# Patient Record
Sex: FEMALE | Race: WHITE | Employment: OTHER
[De-identification: names, ages, dates, MRNs, and addresses within clinical notes are randomized per-mention and may not be internally consistent; named-entity substitution may affect disease eponyms.]

---

## 2018-07-21 ENCOUNTER — HEALTH MAINTENANCE LETTER (OUTPATIENT)
Age: 48
End: 2018-07-21

## 2019-04-25 ENCOUNTER — OFFICE VISIT (OUTPATIENT)
Dept: URGENT CARE | Facility: RETAIL CLINIC | Age: 49
End: 2019-04-25
Payer: COMMERCIAL

## 2019-04-25 VITALS
SYSTOLIC BLOOD PRESSURE: 122 MMHG | DIASTOLIC BLOOD PRESSURE: 75 MMHG | HEART RATE: 87 BPM | TEMPERATURE: 98.5 F | OXYGEN SATURATION: 97 %

## 2019-04-25 DIAGNOSIS — J20.9 ACUTE BRONCHITIS WITH SYMPTOMS > 10 DAYS: Primary | ICD-10-CM

## 2019-04-25 PROCEDURE — 99213 OFFICE O/P EST LOW 20 MIN: CPT | Performed by: INTERNAL MEDICINE

## 2019-04-25 RX ORDER — AZITHROMYCIN 250 MG/1
TABLET, FILM COATED ORAL
Qty: 6 TABLET | Refills: 0 | Status: SHIPPED | OUTPATIENT
Start: 2019-04-25 | End: 2019-09-20

## 2019-04-25 RX ORDER — ALBUTEROL SULFATE 90 UG/1
2 AEROSOL, METERED RESPIRATORY (INHALATION) 4 TIMES DAILY
Qty: 8.5 G | Refills: 0 | Status: SHIPPED | OUTPATIENT
Start: 2019-04-25 | End: 2019-09-20

## 2019-04-25 RX ORDER — HYDROCODONE BITARTRATE AND ACETAMINOPHEN 5; 325 MG/1; MG/1
1-2 TABLET ORAL EVERY 4 HOURS PRN
COMMUNITY
Start: 2018-02-05

## 2019-04-25 RX ORDER — CLONAZEPAM 0.5 MG/1
0.5 TABLET ORAL 3 TIMES DAILY PRN
COMMUNITY
Start: 2016-10-15

## 2019-04-25 RX ORDER — ESCITALOPRAM OXALATE 20 MG/1
TABLET ORAL
COMMUNITY
Start: 2017-08-30 | End: 2019-04-25

## 2019-04-25 NOTE — PROGRESS NOTES
[facility] Care Progress Note        Ileana Lake MD, MPH  04/25/2019        History:      Heaven Contreras is a pleasant 49 year old year old female with cough and whitish/yellowish sputum production as well as wheezing w/o dyspnea or chest pain x 2 weeks  No fever or chills. .    Smokes 1/2 pack of cigs daily.  No headache or neck stiffness.  No GI or  symptoms.   No MSK symptoms.  No rash         Assessment and Plan:         Acute bronchitis with symptoms > 10 days   Advised to stop smoking.  - azithromycin (ZITHROMAX) 250 MG tablet; Two tablets first day, then one tablet daily for four days.  Dispense: 6 tablet; Refill: 0  - albuterol (PROAIR HFA/PROVENTIL HFA/VENTOLIN HFA) 108 (90 Base) MCG/ACT inhaler; Inhale 2 puffs into the lungs 4 times daily for 7 days  Dispense: 8.5 g; Refill: 0    Discussed supportive care with the patient  Advised to increase fluid intake and rest  F/u w PCP in 4-5 days, earlier if symptoms worsen.                   Physical Exam:      /75   Pulse 87   Temp 98.5  F (36.9  C) (Oral)   SpO2 97%      Constitutional: Patient is in no distress The patient is pleasant and cooperative.   HEENT: Head:  Head is atraumatic, normocephalic.    Eyes: Pupils are equal, round and reactive to light and accomodation.  Sclera is non-icteric. No conjunctival injection, or exudate noted. Extraocular motion is intact. Visual acuity is intact bilaterally.  Ears:  External acoustic canals are patent and clear.  There is no erythema and bulging( exudate)  of the ( R/L ) tympanic membrane(s ).   Nose:  Nasal congestion w/o drainage or mucosal ulceration is noted.  Throat:  Oral mucosa is moist.  No oral lesions are noted. Posterior pharyngeal hyperemia w/o exudate noted.     Neck Supple.  There is no cervical lymphadenopathy.  No nuchal rigidity noted.  There is no meningismus.     Cardiovascular: Heart is regular to rate and rhythm.  No murmur is noted.     Lungs:  Scattered  ronchi i in the anterior and posterior pulmonary fields. No prolonged expiratory phase. No crackles. No respiratory distress.   Abdomen: Soft and non-tender.    Back No flank tenderness is noted.   Extremeties No edema, no calf tenderness.   Neuro: No focal deficit.   Skin No petechiae or purpura is noted.  There is no rash.   Mood Normal              Data:      All new lab and imaging data was reviewed.   Results for orders placed or performed during the hospital encounter of 05/28/16   Shoulder XR, G/E 3 views, left    Narrative    XR SHOULDER LT G/E 3 VW  5/28/2016 3:04 PM    HISTORY:  injury, rule out dislocation    COMPARISON:  None.      Impression    IMPRESSION:  No acute process. Chronic appearing cortical deformity in  the anterior humeral head, possibly a Hill-Sachs deformity.     CADY CARRERA MD

## 2019-09-20 ENCOUNTER — HOSPITAL ENCOUNTER (INPATIENT)
Facility: CLINIC | Age: 49
LOS: 3 days | Discharge: HOME | End: 2019-09-23
Attending: FAMILY MEDICINE | Admitting: HOSPITALIST
Payer: COMMERCIAL

## 2019-09-20 ENCOUNTER — APPOINTMENT (OUTPATIENT)
Dept: CT IMAGING | Facility: CLINIC | Age: 49
End: 2019-09-20
Attending: FAMILY MEDICINE
Payer: COMMERCIAL

## 2019-09-20 ENCOUNTER — APPOINTMENT (OUTPATIENT)
Dept: GENERAL RADIOLOGY | Facility: CLINIC | Age: 49
End: 2019-09-20
Attending: FAMILY MEDICINE
Payer: COMMERCIAL

## 2019-09-20 DIAGNOSIS — K56.609 SMALL BOWEL OBSTRUCTION (H): ICD-10-CM

## 2019-09-20 DIAGNOSIS — R10.13 ABDOMINAL PAIN, EPIGASTRIC: ICD-10-CM

## 2019-09-20 DIAGNOSIS — R11.2 NAUSEA AND VOMITING, INTRACTABILITY OF VOMITING NOT SPECIFIED, UNSPECIFIED VOMITING TYPE: ICD-10-CM

## 2019-09-20 LAB
ALBUMIN SERPL-MCNC: 4.3 G/DL (ref 3.4–5)
ALBUMIN UR-MCNC: 30 MG/DL
ALP SERPL-CCNC: 70 U/L (ref 40–150)
ALT SERPL W P-5'-P-CCNC: 25 U/L (ref 0–50)
ANION GAP SERPL CALCULATED.3IONS-SCNC: 11 MMOL/L (ref 3–14)
APPEARANCE UR: ABNORMAL
AST SERPL W P-5'-P-CCNC: 23 U/L (ref 0–45)
BASOPHILS # BLD AUTO: 0 10E9/L (ref 0–0.2)
BASOPHILS NFR BLD AUTO: 0.3 %
BILIRUB SERPL-MCNC: 0.7 MG/DL (ref 0.2–1.3)
BILIRUB UR QL STRIP: ABNORMAL
BUN SERPL-MCNC: 20 MG/DL (ref 7–30)
CALCIUM SERPL-MCNC: 9.3 MG/DL (ref 8.5–10.1)
CHLORIDE SERPL-SCNC: 106 MMOL/L (ref 94–109)
CO2 SERPL-SCNC: 23 MMOL/L (ref 20–32)
COLOR UR AUTO: ABNORMAL
CREAT SERPL-MCNC: 0.56 MG/DL (ref 0.52–1.04)
DIFFERENTIAL METHOD BLD: ABNORMAL
EOSINOPHIL NFR BLD AUTO: 0 %
ERYTHROCYTE [DISTWIDTH] IN BLOOD BY AUTOMATED COUNT: 12.6 % (ref 10–15)
GFR SERPL CREATININE-BSD FRML MDRD: >90 ML/MIN/{1.73_M2}
GLUCOSE SERPL-MCNC: 133 MG/DL (ref 70–99)
GLUCOSE UR STRIP-MCNC: NEGATIVE MG/DL
HCT VFR BLD AUTO: 44 % (ref 35–47)
HGB BLD-MCNC: 15.4 G/DL (ref 11.7–15.7)
HGB UR QL STRIP: ABNORMAL
HYALINE CASTS #/AREA URNS LPF: 1 /LPF (ref 0–2)
IMM GRANULOCYTES # BLD: 0.1 10E9/L (ref 0–0.4)
IMM GRANULOCYTES NFR BLD: 0.4 %
KETONES UR STRIP-MCNC: 80 MG/DL
LACTATE BLD-SCNC: 0.9 MMOL/L (ref 0.7–2)
LEUKOCYTE ESTERASE UR QL STRIP: NEGATIVE
LIPASE SERPL-CCNC: 101 U/L (ref 73–393)
LYMPHOCYTES # BLD AUTO: 0.6 10E9/L (ref 0.8–5.3)
LYMPHOCYTES NFR BLD AUTO: 4.6 %
MCH RBC QN AUTO: 31.2 PG (ref 26.5–33)
MCHC RBC AUTO-ENTMCNC: 35 G/DL (ref 31.5–36.5)
MCV RBC AUTO: 89 FL (ref 78–100)
MONOCYTES # BLD AUTO: 0.3 10E9/L (ref 0–1.3)
MONOCYTES NFR BLD AUTO: 2 %
MUCOUS THREADS #/AREA URNS LPF: PRESENT /LPF
NEUTROPHILS # BLD AUTO: 12.6 10E9/L (ref 1.6–8.3)
NEUTROPHILS NFR BLD AUTO: 92.7 %
NITRATE UR QL: NEGATIVE
NRBC # BLD AUTO: 0 10*3/UL
NRBC BLD AUTO-RTO: 0 /100
PH UR STRIP: 6 PH (ref 5–7)
PLATELET # BLD AUTO: 294 10E9/L (ref 150–450)
POTASSIUM SERPL-SCNC: 3.4 MMOL/L (ref 3.4–5.3)
PROT SERPL-MCNC: 7.7 G/DL (ref 6.8–8.8)
RBC # BLD AUTO: 4.93 10E12/L (ref 3.8–5.2)
RBC #/AREA URNS AUTO: 17 /HPF (ref 0–2)
SODIUM SERPL-SCNC: 140 MMOL/L (ref 133–144)
SOURCE: ABNORMAL
SP GR UR STRIP: 1.03 (ref 1–1.03)
SQUAMOUS #/AREA URNS AUTO: <1 /HPF (ref 0–1)
UROBILINOGEN UR STRIP-MCNC: 2 MG/DL (ref 0–2)
WBC # BLD AUTO: 13.6 10E9/L (ref 4–11)
WBC #/AREA URNS AUTO: 2 /HPF (ref 0–5)

## 2019-09-20 PROCEDURE — 83605 ASSAY OF LACTIC ACID: CPT | Performed by: FAMILY MEDICINE

## 2019-09-20 PROCEDURE — 85025 COMPLETE CBC W/AUTO DIFF WBC: CPT | Performed by: FAMILY MEDICINE

## 2019-09-20 PROCEDURE — 96374 THER/PROPH/DIAG INJ IV PUSH: CPT | Mod: 59 | Performed by: FAMILY MEDICINE

## 2019-09-20 PROCEDURE — 80306 DRUG TEST PRSMV INSTRMNT: CPT | Performed by: FAMILY MEDICINE

## 2019-09-20 PROCEDURE — 74019 RADEX ABDOMEN 2 VIEWS: CPT | Mod: TC

## 2019-09-20 PROCEDURE — 99207 ZZC CDG-MDM COMPONENT: MEETS LOW - DOWN CODED: CPT | Performed by: HOSPITALIST

## 2019-09-20 PROCEDURE — 80053 COMPREHEN METABOLIC PANEL: CPT | Performed by: FAMILY MEDICINE

## 2019-09-20 PROCEDURE — 74177 CT ABD & PELVIS W/CONTRAST: CPT

## 2019-09-20 PROCEDURE — 25000128 H RX IP 250 OP 636: Performed by: FAMILY MEDICINE

## 2019-09-20 PROCEDURE — 81001 URINALYSIS AUTO W/SCOPE: CPT | Performed by: FAMILY MEDICINE

## 2019-09-20 PROCEDURE — 96361 HYDRATE IV INFUSION ADD-ON: CPT | Performed by: FAMILY MEDICINE

## 2019-09-20 PROCEDURE — 84145 PROCALCITONIN (PCT): CPT | Performed by: HOSPITALIST

## 2019-09-20 PROCEDURE — 83690 ASSAY OF LIPASE: CPT | Performed by: FAMILY MEDICINE

## 2019-09-20 PROCEDURE — 25000125 ZZHC RX 250: Performed by: FAMILY MEDICINE

## 2019-09-20 PROCEDURE — 96376 TX/PRO/DX INJ SAME DRUG ADON: CPT | Performed by: FAMILY MEDICINE

## 2019-09-20 PROCEDURE — 12000000 ZZH R&B MED SURG/OB

## 2019-09-20 PROCEDURE — 99285 EMERGENCY DEPT VISIT HI MDM: CPT | Mod: Z6 | Performed by: FAMILY MEDICINE

## 2019-09-20 PROCEDURE — 99222 1ST HOSP IP/OBS MODERATE 55: CPT | Mod: AI | Performed by: HOSPITALIST

## 2019-09-20 PROCEDURE — 96375 TX/PRO/DX INJ NEW DRUG ADDON: CPT | Performed by: FAMILY MEDICINE

## 2019-09-20 PROCEDURE — 99285 EMERGENCY DEPT VISIT HI MDM: CPT | Mod: 25 | Performed by: FAMILY MEDICINE

## 2019-09-20 RX ORDER — ONDANSETRON 2 MG/ML
4 INJECTION INTRAMUSCULAR; INTRAVENOUS ONCE
Status: COMPLETED | OUTPATIENT
Start: 2019-09-20 | End: 2019-09-20

## 2019-09-20 RX ORDER — LORAZEPAM 2 MG/ML
0.5 INJECTION INTRAMUSCULAR
Status: COMPLETED | OUTPATIENT
Start: 2019-09-20 | End: 2019-09-20

## 2019-09-20 RX ORDER — HYDROMORPHONE HYDROCHLORIDE 1 MG/ML
0.5 INJECTION, SOLUTION INTRAMUSCULAR; INTRAVENOUS; SUBCUTANEOUS ONCE
Status: COMPLETED | OUTPATIENT
Start: 2019-09-20 | End: 2019-09-20

## 2019-09-20 RX ORDER — ONDANSETRON 4 MG/1
4 TABLET, ORALLY DISINTEGRATING ORAL EVERY 6 HOURS PRN
COMMUNITY
Start: 2017-10-19

## 2019-09-20 RX ORDER — AMOXICILLIN 250 MG
1 CAPSULE ORAL DAILY PRN
COMMUNITY
Start: 2018-07-09

## 2019-09-20 RX ORDER — IOPAMIDOL 755 MG/ML
500 INJECTION, SOLUTION INTRAVASCULAR ONCE
Status: COMPLETED | OUTPATIENT
Start: 2019-09-20 | End: 2019-09-20

## 2019-09-20 RX ADMIN — IOPAMIDOL 50 ML: 755 INJECTION, SOLUTION INTRAVENOUS at 21:12

## 2019-09-20 RX ADMIN — SODIUM CHLORIDE 1000 ML: 9 INJECTION, SOLUTION INTRAVENOUS at 19:44

## 2019-09-20 RX ADMIN — SODIUM CHLORIDE 1000 ML: 9 INJECTION, SOLUTION INTRAVENOUS at 22:04

## 2019-09-20 RX ADMIN — SODIUM CHLORIDE 60 ML: 9 INJECTION, SOLUTION INTRAVENOUS at 21:12

## 2019-09-20 RX ADMIN — HYDROMORPHONE HYDROCHLORIDE 0.5 MG: 1 INJECTION, SOLUTION INTRAMUSCULAR; INTRAVENOUS; SUBCUTANEOUS at 21:29

## 2019-09-20 RX ADMIN — LORAZEPAM 0.5 MG: 2 INJECTION INTRAMUSCULAR; INTRAVENOUS at 23:14

## 2019-09-20 RX ADMIN — ONDANSETRON 4 MG: 2 INJECTION INTRAMUSCULAR; INTRAVENOUS at 21:26

## 2019-09-20 RX ADMIN — ONDANSETRON 4 MG: 2 INJECTION INTRAMUSCULAR; INTRAVENOUS at 19:44

## 2019-09-20 RX ADMIN — LORAZEPAM 0.5 MG: 2 INJECTION INTRAMUSCULAR; INTRAVENOUS at 22:06

## 2019-09-20 ASSESSMENT — MIFFLIN-ST. JEOR: SCORE: 1063.6

## 2019-09-21 ENCOUNTER — APPOINTMENT (OUTPATIENT)
Dept: GENERAL RADIOLOGY | Facility: CLINIC | Age: 49
End: 2019-09-21
Attending: HOSPITALIST
Payer: COMMERCIAL

## 2019-09-21 PROBLEM — D35.01 ADRENAL ADENOMA, RIGHT: Status: ACTIVE | Noted: 2019-09-21

## 2019-09-21 PROBLEM — K56.609 SBO (SMALL BOWEL OBSTRUCTION) (H): Status: ACTIVE | Noted: 2019-09-21

## 2019-09-21 PROBLEM — E27.8 ADRENAL MASS, RIGHT (H): Status: ACTIVE | Noted: 2019-09-21

## 2019-09-21 PROBLEM — E27.8 ADRENAL MASS, RIGHT (H): Status: RESOLVED | Noted: 2019-09-21 | Resolved: 2019-09-21

## 2019-09-21 LAB
ALBUMIN SERPL-MCNC: 3.2 G/DL (ref 3.4–5)
ALP SERPL-CCNC: 53 U/L (ref 40–150)
ALT SERPL W P-5'-P-CCNC: 19 U/L (ref 0–50)
AMPHETAMINES UR QL: NOT DETECTED NG/ML
ANION GAP SERPL CALCULATED.3IONS-SCNC: 8 MMOL/L (ref 3–14)
AST SERPL W P-5'-P-CCNC: 17 U/L (ref 0–45)
BARBITURATES UR QL SCN: NOT DETECTED NG/ML
BENZODIAZ UR QL SCN: ABNORMAL NG/ML
BILIRUB SERPL-MCNC: 0.6 MG/DL (ref 0.2–1.3)
BUN SERPL-MCNC: 17 MG/DL (ref 7–30)
BUPRENORPHINE UR QL: NOT DETECTED NG/ML
CALCIUM SERPL-MCNC: 8.1 MG/DL (ref 8.5–10.1)
CANNABINOIDS UR QL: ABNORMAL NG/ML
CHLORIDE SERPL-SCNC: 112 MMOL/L (ref 94–109)
CO2 SERPL-SCNC: 23 MMOL/L (ref 20–32)
COCAINE UR QL SCN: NOT DETECTED NG/ML
CREAT SERPL-MCNC: 0.62 MG/DL (ref 0.52–1.04)
D-METHAMPHET UR QL: NOT DETECTED NG/ML
ERYTHROCYTE [DISTWIDTH] IN BLOOD BY AUTOMATED COUNT: 13 % (ref 10–15)
GFR SERPL CREATININE-BSD FRML MDRD: >90 ML/MIN/{1.73_M2}
GLUCOSE SERPL-MCNC: 134 MG/DL (ref 70–99)
HCT VFR BLD AUTO: 38.5 % (ref 35–47)
HGB BLD-MCNC: 13.1 G/DL (ref 11.7–15.7)
MCH RBC QN AUTO: 31 PG (ref 26.5–33)
MCHC RBC AUTO-ENTMCNC: 34 G/DL (ref 31.5–36.5)
MCV RBC AUTO: 91 FL (ref 78–100)
METHADONE UR QL SCN: NOT DETECTED NG/ML
OPIATES UR QL SCN: ABNORMAL NG/ML
OXYCODONE UR QL SCN: NOT DETECTED NG/ML
PCP UR QL SCN: NOT DETECTED NG/ML
PLATELET # BLD AUTO: 249 10E9/L (ref 150–450)
POTASSIUM SERPL-SCNC: 3.4 MMOL/L (ref 3.4–5.3)
PROCALCITONIN SERPL-MCNC: <0.05 NG/ML
PROPOXYPH UR QL: NOT DETECTED NG/ML
PROT SERPL-MCNC: 6 G/DL (ref 6.8–8.8)
RBC # BLD AUTO: 4.22 10E12/L (ref 3.8–5.2)
SODIUM SERPL-SCNC: 143 MMOL/L (ref 133–144)
TRICYCLICS UR QL SCN: NOT DETECTED NG/ML
WBC # BLD AUTO: 12.5 10E9/L (ref 4–11)

## 2019-09-21 PROCEDURE — 99233 SBSQ HOSP IP/OBS HIGH 50: CPT | Performed by: FAMILY MEDICINE

## 2019-09-21 PROCEDURE — 25000128 H RX IP 250 OP 636: Performed by: HOSPITALIST

## 2019-09-21 PROCEDURE — 12000000 ZZH R&B MED SURG/OB

## 2019-09-21 PROCEDURE — 74018 RADEX ABDOMEN 1 VIEW: CPT | Mod: TC

## 2019-09-21 PROCEDURE — 85027 COMPLETE CBC AUTOMATED: CPT | Performed by: FAMILY MEDICINE

## 2019-09-21 PROCEDURE — 25000132 ZZH RX MED GY IP 250 OP 250 PS 637: Performed by: HOSPITALIST

## 2019-09-21 PROCEDURE — 36415 COLL VENOUS BLD VENIPUNCTURE: CPT | Performed by: FAMILY MEDICINE

## 2019-09-21 PROCEDURE — 99207 ZZC CDG-MDM COMPONENT: MEETS MODERATE - UP CODED: CPT | Performed by: FAMILY MEDICINE

## 2019-09-21 PROCEDURE — 25800030 ZZH RX IP 258 OP 636: Performed by: HOSPITALIST

## 2019-09-21 PROCEDURE — 80053 COMPREHEN METABOLIC PANEL: CPT | Performed by: FAMILY MEDICINE

## 2019-09-21 PROCEDURE — 71046 X-RAY EXAM CHEST 2 VIEWS: CPT | Mod: TC

## 2019-09-21 RX ORDER — LIDOCAINE 40 MG/G
CREAM TOPICAL
Status: DISCONTINUED | OUTPATIENT
Start: 2019-09-21 | End: 2019-09-23 | Stop reason: HOSPADM

## 2019-09-21 RX ORDER — BISACODYL 10 MG
10 SUPPOSITORY, RECTAL RECTAL DAILY PRN
Status: DISCONTINUED | OUTPATIENT
Start: 2019-09-21 | End: 2019-09-23 | Stop reason: HOSPADM

## 2019-09-21 RX ORDER — ONDANSETRON 4 MG/1
4 TABLET, ORALLY DISINTEGRATING ORAL EVERY 6 HOURS PRN
Status: DISCONTINUED | OUTPATIENT
Start: 2019-09-21 | End: 2019-09-23 | Stop reason: HOSPADM

## 2019-09-21 RX ORDER — HYDROMORPHONE HYDROCHLORIDE 1 MG/ML
0.2 INJECTION, SOLUTION INTRAMUSCULAR; INTRAVENOUS; SUBCUTANEOUS EVERY 6 HOURS PRN
Status: DISCONTINUED | OUTPATIENT
Start: 2019-09-21 | End: 2019-09-22

## 2019-09-21 RX ORDER — HYDRALAZINE HYDROCHLORIDE 20 MG/ML
10 INJECTION INTRAMUSCULAR; INTRAVENOUS EVERY 6 HOURS PRN
Status: DISCONTINUED | OUTPATIENT
Start: 2019-09-21 | End: 2019-09-23 | Stop reason: HOSPADM

## 2019-09-21 RX ORDER — ACETAMINOPHEN 650 MG/1
650 SUPPOSITORY RECTAL EVERY 4 HOURS PRN
Status: DISCONTINUED | OUTPATIENT
Start: 2019-09-21 | End: 2019-09-23 | Stop reason: HOSPADM

## 2019-09-21 RX ORDER — LORAZEPAM 2 MG/ML
0.5 INJECTION INTRAMUSCULAR EVERY 6 HOURS PRN
Status: DISCONTINUED | OUTPATIENT
Start: 2019-09-21 | End: 2019-09-22

## 2019-09-21 RX ORDER — ONDANSETRON 2 MG/ML
4 INJECTION INTRAMUSCULAR; INTRAVENOUS EVERY 6 HOURS PRN
Status: DISCONTINUED | OUTPATIENT
Start: 2019-09-21 | End: 2019-09-23 | Stop reason: HOSPADM

## 2019-09-21 RX ORDER — NALOXONE HYDROCHLORIDE 0.4 MG/ML
.1-.4 INJECTION, SOLUTION INTRAMUSCULAR; INTRAVENOUS; SUBCUTANEOUS
Status: DISCONTINUED | OUTPATIENT
Start: 2019-09-21 | End: 2019-09-23 | Stop reason: HOSPADM

## 2019-09-21 RX ADMIN — Medication 1 LOZENGE: at 22:02

## 2019-09-21 RX ADMIN — HYDROMORPHONE HYDROCHLORIDE 0.2 MG: 1 INJECTION, SOLUTION INTRAMUSCULAR; INTRAVENOUS; SUBCUTANEOUS at 18:14

## 2019-09-21 RX ADMIN — ONDANSETRON 4 MG: 2 INJECTION INTRAMUSCULAR; INTRAVENOUS at 01:59

## 2019-09-21 RX ADMIN — DEXTROSE AND SODIUM CHLORIDE: 5; 900 INJECTION, SOLUTION INTRAVENOUS at 01:01

## 2019-09-21 RX ADMIN — HYDROMORPHONE HYDROCHLORIDE 0.2 MG: 1 INJECTION, SOLUTION INTRAMUSCULAR; INTRAVENOUS; SUBCUTANEOUS at 12:27

## 2019-09-21 RX ADMIN — HYDROMORPHONE HYDROCHLORIDE 0.2 MG: 1 INJECTION, SOLUTION INTRAMUSCULAR; INTRAVENOUS; SUBCUTANEOUS at 06:16

## 2019-09-21 RX ADMIN — DEXTROSE AND SODIUM CHLORIDE: 5; 900 INJECTION, SOLUTION INTRAVENOUS at 11:27

## 2019-09-21 RX ADMIN — LORAZEPAM 0.5 MG: 2 INJECTION INTRAMUSCULAR; INTRAVENOUS at 22:14

## 2019-09-21 RX ADMIN — HYDROMORPHONE HYDROCHLORIDE 0.2 MG: 1 INJECTION, SOLUTION INTRAMUSCULAR; INTRAVENOUS; SUBCUTANEOUS at 21:50

## 2019-09-21 RX ADMIN — LORAZEPAM 0.5 MG: 2 INJECTION INTRAMUSCULAR; INTRAVENOUS at 07:52

## 2019-09-21 RX ADMIN — Medication 1 MG: at 21:50

## 2019-09-21 RX ADMIN — DEXTROSE AND SODIUM CHLORIDE: 5; 900 INJECTION, SOLUTION INTRAVENOUS at 21:49

## 2019-09-21 RX ADMIN — LORAZEPAM 0.5 MG: 2 INJECTION INTRAMUSCULAR; INTRAVENOUS at 16:29

## 2019-09-21 ASSESSMENT — ACTIVITIES OF DAILY LIVING (ADL)
TRANSFERRING: 0-->INDEPENDENT
TOILETING: 0-->INDEPENDENT
BATHING: 0-->INDEPENDENT
ADLS_ACUITY_SCORE: 10
ADLS_ACUITY_SCORE: 10
DRESS: 0-->INDEPENDENT
FALL_HISTORY_WITHIN_LAST_SIX_MONTHS: NO
RETIRED_COMMUNICATION: 0-->UNDERSTANDS/COMMUNICATES WITHOUT DIFFICULTY
COGNITION: 0 - NO COGNITION ISSUES REPORTED
ADLS_ACUITY_SCORE: 10
SWALLOWING: 0-->SWALLOWS FOODS/LIQUIDS WITHOUT DIFFICULTY
AMBULATION: 0-->INDEPENDENT
ADLS_ACUITY_SCORE: 10
ADLS_ACUITY_SCORE: 10
RETIRED_EATING: 0-->INDEPENDENT

## 2019-09-21 ASSESSMENT — MIFFLIN-ST. JEOR
SCORE: 1102.15
SCORE: 1102.15

## 2019-09-21 NOTE — PLAN OF CARE
Patient has slept well throughout the night between cares. Dilaudid given x 1 for abdominal discomfort and burning. She denies passing gas. Bowel sounds hypoactive. Zofran given x 1. NG in place with 150ml output. Urine is dark doron in color. Pt has had a low grade temp since arrival. Max was 100.3. Other vitals stable. Incentive spirometer introduced.

## 2019-09-21 NOTE — H&P
[facility]    History and Physical - Hospitalist Service       Date of Admission:  9/20/2019    Assessment & Plan   Heaven Contreras is a 49 year old female admitted on 9/20/2019.     SBO  -D5NS while NPO  -NG to low intermittent suction    Leukocytosis  -CXR to rule out aspiration  -procalcitonin pending    Microscopic hematuria  -follow up with PCP as outpatient    Elevated BP without diagnosis of HTN  -hydralazine prn    Anxiety  -replace po klonopin with iv ativan prn until bowel function returns    depression  -resume lexapro when po intake is allowed    Regional pain syndrome  -need to taper off narcotics in the near future if SBO persists    Diet: NPO  DVT Prophylaxis: Pneumatic Compression Devices  Lilly Catheter: in place, indication:    Code Status: full    Disposition Plan   Expected discharge: 2 - 3 days, recommended to prior living arrangement once bowel moves.  Entered: Arlin Ordaz MD 09/20/2019, 11:54 PM     The patient's care was discussed with the Patient.    Arlin Ordaz MD  [facility]    ______________________________________________________________________    Chief Complaint   Abdominal pain with n/v    History of Present Illness   Heaven Contreras is a 49 year old female who presented to the emergency department with acute onset of abdominal pain and nausea since this morning.  Patient states that she started to feel unwell yesterday, but went to bed and slept through the night.  She woke up this morning with nausea and threw up 6-7 times undigested food from last night followed by pediolyte from today.  Abdominal pain is located in the mid abdomen and migrated towards the right upper quadrant. She has no prior history of gallbladder or peptic ulcer disease.  No micturation today, no diarrhea  Patient has been going to the [facility] in [place], but recently moved to this area.    Review of Systems    The 10 point Review of  Systems is negative other than noted in the HPI or here.     Past Medical History    I have reviewed this patient's medical history and updated it with pertinent information if needed.   Past Medical History:   Diagnosis Date     Endometriosis, site unspecified      Generalized anxiety disorder     panic attack history     Irritable bowel syndrome      Major depressive disorder, recurrent, moderate (H)      MIGRAINE NOS W/O MENTN INTRACTABLE 2007     Other bursitis disorders     left shoulder     RSD (reflex sympathetic dystrophy)     Complex Regional Pan Syndrome     Temporomandibular joint disorders, unspecified     TMJ - Bilateral     Tobacco abuse     Smokes < 1ppd. Started smoking at 14 years old       Past Surgical History   I have reviewed this patient's surgical history and updated it with pertinent information if needed.  Past Surgical History:   Procedure Laterality Date     C NONSPECIFIC PROCEDURE       x 1 (toxemia) EABx 1     HC SHOULDER ARTHROSCOPY, DX      bone spur - Left Shoulder     LAPAROSCOPIC TUBAL LIGATION  2009     PELVIS LAPAROSCOPY,DX   ,    endometriosis     SURGICAL HISTORY OF -   3/91    surgery for left TMJ     SURGICAL HISTORY OF -       colposcopy, cryosurgery for abnormal pap smear       Social History   I have reviewed this patient's social history and updated it with pertinent information if needed.  Social History     Tobacco Use     Smoking status: Current Every Day Smoker     Packs/day: 0.50     Years: 15.00     Pack years: 7.50     Types: Cigarettes     Smokeless tobacco: Never Used     Tobacco comment: Patient smoking 10 cigarettes per day   Substance Use Topics     Alcohol use: No     Alcohol/week: 0.0 oz     Drug use: No       Family History   I have reviewed this patient's family history and updated it with pertinent information if needed.   Family History   Problem Relation Age of Onset     Hypertension Brother         alcoholic o bese     Diabetes  "Brother      Alcohol/Drug Brother      Depression Brother      Depression Mother         obese     Alcohol/Drug Mother      Diabetes Mother      Depression Father      Alcohol/Drug Father      Diabetes Sister      Depression Son         anxiety     Asthma Son      Diabetes Maternal Grandmother      KIRBY. Maternal Grandfather      Breast Cancer Maternal Aunt        Prior to Admission Medications   Prior to Admission Medications   Prescriptions Last Dose Informant Patient Reported? Taking?   HYDROcodone-acetaminophen (NORCO) 5-325 MG tablet 9/20/2019 at 1300  Yes Yes   Sig: Take 1-2 tablets by mouth   ORDER FOR DME   No No   Sig: Equipment being ordered: a cane   clonazePAM (KLONOPIN) 0.5 MG tablet 9/20/2019 at 0800  Yes Yes   Sig: Take 0.5 mg by mouth   escitalopram (LEXAPRO) 20 MG tablet 9/19/2019 at 2000  Yes Yes   Sig: Take 20 mg by mouth daily. Take 40 mg tablet daily   ondansetron (ZOFRAN-ODT) 4 MG ODT tab 9/20/2019 at 1300  Yes Yes   Sig: Place 4 mg under the tongue   senna-docusate (SENOKOT-S/PERICOLACE) 8.6-50 MG tablet Past Week at Unknown time  Yes Yes   Sig: Take 1 tablet by mouth      Facility-Administered Medications: None     Allergies   Allergies   Allergen Reactions     Compazine [Prochlorperazine]      Feeling severe anxiety     Flexeril [Cyclobenzaprine] Other (See Comments)     Aggitation     Gabapentin      Swelling of legs     Lyrica [Pregabalin]      Prednisone Other (See Comments)     \"manic, cramping\"     Toradol Swelling     Rapid weight gain - fluid overlaod       Physical Exam   Vital Signs: Temp: 97.5  F (36.4  C) Temp src: Temporal BP: (!) 133/91 Pulse: 91   Resp: 16 SpO2: 97 % O2 Device: None (Room air)    Weight: 100 lbs 0 oz    GENERAL APPEARANCE: Alert, mild distress due to nausea and dehydration.  She has an emesis bag in hand  FACE: normal facies  EYES: Pupils are equal; sclerae nonicteric; eyes are slightly sunken  HENT: normal external exam; mucous membranes are moist  NECK: no " adenopathy or asymmetry  RESP: normal respiratory effort; clear breath sounds bilaterally  CV: regular rate and rhythm; no significant murmurs, gallops or rubs  ABD: soft, mid to epigastric tenderness; no rebound or guarding; bowel sounds are hyperactive  MS: no gross deformities noted; normal muscle tone.  EXT: No calf tenderness or pitting edema  SKIN: no worrisome rash  NEURO: no facial droop; no focal deficits, speech is normal          Data   Data reviewed today: I reviewed all medications, new labs and imaging results over the last 24 hours. I personally reviewed .    CT of abdomen and pelvis  IMPRESSION:  1. Dilated small bowel loops with decompressed terminal ileum  compatible with mechanical small bowel obstruction in the distal small  bowel. Obstructing hernia or mass not demonstrated.  2. No evidence of bowel perforation or abscess. There is trace  nonspecific free fluid in the pelvis.  3. Severe atherosclerosis of the aortic bifurcation.  4. Indeterminate right adrenal nodule measures 1.4 cm. Consider  nonemergent MRI for further evaluation.    KUB  IMPRESSION: There are mildly dilated loops of small bowel in the  central abdomen that measure up to 3.0 cm. There is stool and gas in  the colon. No free air or abdominal mass effect. These findings may  represent early mechanical small bowel obstruction or mild ileus.    Recent Labs   Lab 09/20/19  1938   WBC 13.6*   HGB 15.4   MCV 89         POTASSIUM 3.4   CHLORIDE 106   CO2 23   BUN 20   CR 0.56   ANIONGAP 11   JAIME 9.3   *   ALBUMIN 4.3   PROTTOTAL 7.7   BILITOTAL 0.7   ALKPHOS 70   ALT 25   AST 23   LIPASE 101

## 2019-09-21 NOTE — PROGRESS NOTES
S-(situation): Patient arrives to room 271 via cart from ED    B-(background): SBO    A-(assessment): Pt is alert and oriented. Denies nausea upon arrival. NG in place with drainage in tubing. Tubing marked at nares for landmark. Bowel sounds hypoactive. She reports mild abdominal burning but no significant pain at this time. Low grade temp upon arrival. Other vitals stable.    R-(recommendations): Orders reviewed with patient. Will monitor patient per MD orders.

## 2019-09-21 NOTE — PROGRESS NOTES
[facility]    Medicine Progress Note - Hospitalist Service       Date of Admission:  9/20/2019  Assessment & Plan   Principal Problem:    SBO (small bowel obstruction) (H)    Assessment: Most likely secondary to adhesions as patient has had 3 surgical procedures on her abdomen in the past.  Patient has responded well with resolution of her nausea, vomiting, and abdominal pain following NG tube placement on intermittent suction.  Patient did pass a small amount of flatus within the past few hours and has had increase abdominal bowel sounds over the course of this stay.     Plan: We will continue with medical management with ongoing NG tube decompression, IV fluids and pain medication as needed plan nothing by mouth and monitor for ongoing resolution of the small bowel obstruction.    Active Problems:    Generalized anxiety disorder    Assessment: Normally well controlled with Lexapro and clonazepam, which are currently on hold secondary to patient's small bowel obstruction as above.  Patient does have PRN Ativan available for anxiety and this is been working well when needed.    Plan: We will continue with IV Ativan, restart Lexapro and clonazepam when patient is able to take oral medications.        RSD (reflex sympathetic dystrophy)    Assessment: Causing chronic pain for which patient normally takes hydrocodone/acetaminophen 10/325mg every every hours as needed for pain with 150 tablets prescribed per month.  Has been receiving Dilaudid 0.2 mg IV every 6 hours as needed for pain with chronic pain fairly well controlled.    Plan: Continue with PRN IV Dilaudid while patient is n.p.o., resume patient's home hydrocodone regimen when patient is able to take oral medications.      Chronic constipation    Assessment: secondary to chronic narcotic use and some concern for underlying IBS.      Plan: no acute management is needed but would consider Relistor administration if there is concern  for narcotic related constipation while patient is still NPO.        Adrenal adenoma, right    Assessment: has been being followed as an outpatient and per outside records has been stable in size.  Patient has an appointment with an endocrinologist next month for further evaluation    Plan: no acute intervention is needed, patient will proceed with outpatient follow up as previously recommended.       Diet: NPO for Medical/Clinical Reasons Except for: Meds, Ice Chips    DVT Prophylaxis: Pneumatic Compression Devices  Lilly Catheter: not present  Code Status: Full Code      Disposition Plan   Expected discharge: 2-4 days, recommended to prior living arrangement once small bowel obstruction has resolved, patient is tolerating diet advancement without difficulty.  Entered: Janey Santiago MD 09/21/2019, 2:53 PM       The patient's care was discussed with the Bedside Nurse and Patient.    Janey Santiago MD  Hospitalist Service  [facility]    ______________________________________________________________________    Interval History   Patient has remained vitally stable.  She reports that her nausea and abdominal symptoms have significantly improved since the NG tube was placed.  She is starting to become more audible bowel sounds and did pass a very small flatus a few hours ago but has not had any since.  The IV dilaudid at current dosing is working fairly well for patient's chronic pain.  No new nursing concerns or patient concerns.      Data reviewed today: I reviewed all medications, new labs and imaging results over the last 24 hours.    Physical Exam   Vital Signs: Temp: 98.3  F (36.8  C) Temp src: Oral BP: 110/71 Pulse: 77   Resp: 16 SpO2: 96 % O2 Device: None (Room air)    Weight: 108 lbs 8 oz  Constitutional: awake, alert, cooperative, no apparent distress, and appears stated age  Respiratory: No increased work of breathing, good air exchange, clear to  auscultation bilaterally, no crackles or wheezing  Cardiovascular: Normal apical impulse, regular rate and rhythm, normal S1 and S2, no S3 or S4, and no murmur noted  GI: bowel sounds are hypoactive but present in all four quadrants.  Abdomen is soft and non-tender on palpation   Skin: no redness, warmth, or swelling  Musculoskeletal: no lower extremity pitting edema present  Neurologic: Awake, alert, oriented to name, place and time.     Data   Recent Labs   Lab 09/21/19  0758 09/20/19  1938   WBC 12.5* 13.6*   HGB 13.1 15.4   MCV 91 89    294    140   POTASSIUM 3.4 3.4   CHLORIDE 112* 106   CO2 23 23   BUN 17 20   CR 0.62 0.56   ANIONGAP 8 11   JAIME 8.1* 9.3   * 133*   ALBUMIN 3.2* 4.3   PROTTOTAL 6.0* 7.7   BILITOTAL 0.6 0.7   ALKPHOS 53 70   ALT 19 25   AST 17 23   LIPASE  --  101

## 2019-09-21 NOTE — ED PROVIDER NOTES
[facility] ED Provider Note   Patient: Heaven Contreras  MRN #:  [id number]  Date of Visit: September 20, 2019    CC:     Chief Complaint   Patient presents with     Nausea & Vomiting     HPI:  Heaven Contreras is a 49 year old female with a history of chronic regional pain syndrome, irritable bowel, chronic constipation, and TMJ who presented to the emergency department with acute onset of nausea with abdominal pain since this morning.  Patient states that she was not feeling well yesterday, but went to bed and slept through the night.  She woke up this morning with nausea, drove her child to school, and came back home.  She started to throw up undigested food from last night.  She continued to throw up 6 or 7 more times with mostly fluid that she was trying to drink.  She was drinking Pedialyte to try to stay hydrated, but has not voided much today.  She did not have any diarrhea.  Abdominal pain is located in the mid region but now migrating more in the right upper quadrant.  She has no prior history of gallbladder or peptic ulcer disease.  Patient states that she was not able to keep her Klonopin or Norco down.  She is accompanied by her , Michelet.  Patient has been going to the [facility] in [place], but recently moved to this area.    Problem List:  Patient Active Problem List    Diagnosis Date Noted     Underweight 04/15/2016     Priority: Medium     Finger pain 02/09/2016     Priority: Medium     Thumb pain, right 02/09/2016     Priority: Medium     Pain in both wrists 02/09/2016     Priority: Medium     Weight loss 08/05/2014     Priority: Medium     Nausea 11/26/2013     Priority: Medium     Knee pain 10/02/2012     Priority: Medium     Weakness of right leg 10/02/2012     Priority: Medium     Health Care Home 07/26/2011     Priority: Medium     X  DX V65.8 REPLACED WITH [id number]ALTH CARE HOME (04/08/2013)       Seasonal  allergic rhinitis 05/24/2011     Priority: Medium     Urge incontinence 12/24/2010     Priority: Medium     Chronic constipation 12/24/2010     Priority: Medium     Chronic pain syndrome 12/03/2010     Priority: Medium     Vitamin D deficiency 06/08/2010     Priority: Medium     Tobacco abuse      Priority: Medium     Smokes < 1ppd. Started smoking at 14 years old       CARDIOVASCULAR SCREENING; LDL GOAL LESS THAN 100 05/09/2010     Priority: Medium     RSD (reflex sympathetic dystrophy)      Priority: Medium     Major depressive disorder, recurrent, moderate (H)      Priority: Medium     Ovarian cyst 12/31/2008     Priority: Medium     5.9 cm, right, Noted on LEVEL II       Lumbago 09/04/2007     Priority: Medium     Pain in joint, pelvic region and thigh 09/04/2007     Priority: Medium     Migraine 07/11/2007     Priority: Medium     Problem list name updated by automated process. Provider to review       Generalized anxiety disorder 12/03/2004     Priority: Medium     panic attack history       Other bursitis disorders 12/03/2004     Priority: Medium     left shoulder       Irritable bowel syndrome 11/30/2004     Priority: Medium     Temporomandibular joint disorder 11/30/2004     Priority: Medium     TMJ  Problem list name updated by automated process. Provider to review         Past Medical History:   Diagnosis Date     Endometriosis, site unspecified      Generalized anxiety disorder      Irritable bowel syndrome      Major depressive disorder, recurrent, moderate (H)      MIGRAINE NOS W/O MENTN INTRACTABLE 7/11/2007     Other bursitis disorders      RSD (reflex sympathetic dystrophy)      Temporomandibular joint disorders, unspecified      Tobacco abuse        MEDS:     clonazePAM (KLONOPIN) 0.5 MG tablet   escitalopram (LEXAPRO) 20 MG tablet   HYDROcodone-acetaminophen (NORCO) 5-325 MG tablet   ondansetron (ZOFRAN-ODT) 4 MG ODT tab   senna-docusate (SENOKOT-S/PERICOLACE) 8.6-50 MG tablet   ORDER FOR DME  "      ALLERGIES:    Allergies   Allergen Reactions     Compazine [Prochlorperazine]      Feeling severe anxiety     Flexeril [Cyclobenzaprine] Other (See Comments)     Aggitation     Gabapentin      Swelling of legs     Lyrica [Pregabalin]      Prednisone Other (See Comments)     \"manic, cramping\"     Toradol Swelling     Rapid weight gain - fluid overlaod       Past Surgical History:   Procedure Laterality Date     C NONSPECIFIC PROCEDURE       x 1 (toxemia) EABx 1     HC SHOULDER ARTHROSCOPY, DX      bone spur - Left Shoulder     LAPAROSCOPIC TUBAL LIGATION  2009     PELVIS LAPAROSCOPY,DX   ,    endometriosis     SURGICAL HISTORY OF -   3/91    surgery for left TMJ     SURGICAL HISTORY OF -       colposcopy, cryosurgery for abnormal pap smear       Social History     Tobacco Use     Smoking status: Current Every Day Smoker     Packs/day: 0.50     Years: 15.00     Pack years: 7.50     Types: Cigarettes     Smokeless tobacco: Never Used     Tobacco comment: Patient smoking 10 cigarettes per day   Substance Use Topics     Alcohol use: No     Alcohol/week: 0.0 oz     Drug use: No         Review of Systems   Except as noted in HPI, all other systems were reviewed and are negative    Physical Exam     Vitals were reviewed  Patient Vitals for the past 12 hrs:   BP Temp Temp src Pulse Resp SpO2 Height Weight   19 2315 (!) 133/91 -- -- 91 -- 97 % -- --   19 135/85 -- -- 83 16 98 % -- --   19 137/85 -- -- 97 16 96 % -- --   19 (!) 132/94 -- -- 86 -- -- -- --   19 (!) 146/91 -- -- 81 -- -- -- --   19 -- -- -- -- -- -- 1.626 m (5' 4\") 45.4 kg (100 lb)   19 (!) 146/104 97.5  F (36.4  C) Temporal 81 20 99 % -- --     GENERAL APPEARANCE: Alert, mild distress due to nausea and dehydration.  She has an emesis bag in hand  FACE: normal facies  EYES: Pupils are equal; sclerae nonicteric; eyes are slightly sunken  HENT: normal external exam; " mucous membranes are moist  NECK: no adenopathy or asymmetry  RESP: normal respiratory effort; clear breath sounds bilaterally  CV: regular rate and rhythm; no significant murmurs, gallops or rubs  ABD: soft, mid to epigastric tenderness; no rebound or guarding; bowel sounds are hyperactive  MS: no gross deformities noted; normal muscle tone.  EXT: No calf tenderness or pitting edema  SKIN: no worrisome rash  NEURO: no facial droop; no focal deficits, speech is normal        Available Lab/Imaging Results     Results for orders placed or performed during the hospital encounter of 09/20/19 (from the past 24 hour(s))   CBC with platelets differential   Result Value Ref Range    WBC 13.6 (H) 4.0 - 11.0 10e9/L    RBC Count 4.93 3.8 - 5.2 10e12/L    Hemoglobin 15.4 11.7 - 15.7 g/dL    Hematocrit 44.0 35.0 - 47.0 %    MCV 89 78 - 100 fl    MCH 31.2 26.5 - 33.0 pg    MCHC 35.0 31.5 - 36.5 g/dL    RDW 12.6 10.0 - 15.0 %    Platelet Count 294 150 - 450 10e9/L    Diff Method Automated Method     % Neutrophils 92.7 %    % Lymphocytes 4.6 %    % Monocytes 2.0 %    % Eosinophils 0.0 %    % Basophils 0.3 %    % Immature Granulocytes 0.4 %    Nucleated RBCs 0 0 /100    Absolute Neutrophil 12.6 (H) 1.6 - 8.3 10e9/L    Absolute Lymphocytes 0.6 (L) 0.8 - 5.3 10e9/L    Absolute Monocytes 0.3 0.0 - 1.3 10e9/L    Absolute Basophils 0.0 0.0 - 0.2 10e9/L    Abs Immature Granulocytes 0.1 0 - 0.4 10e9/L    Absolute Nucleated RBC 0.0    Comprehensive metabolic panel   Result Value Ref Range    Sodium 140 133 - 144 mmol/L    Potassium 3.4 3.4 - 5.3 mmol/L    Chloride 106 94 - 109 mmol/L    Carbon Dioxide 23 20 - 32 mmol/L    Anion Gap 11 3 - 14 mmol/L    Glucose 133 (H) 70 - 99 mg/dL    Urea Nitrogen 20 7 - 30 mg/dL    Creatinine 0.56 0.52 - 1.04 mg/dL    GFR Estimate >90 >60 mL/min/[1.73_m2]    GFR Estimate If Black >90 >60 mL/min/[1.73_m2]    Calcium 9.3 8.5 - 10.1 mg/dL    Bilirubin Total 0.7 0.2 - 1.3 mg/dL    Albumin 4.3 3.4 - 5.0 g/dL     Protein Total 7.7 6.8 - 8.8 g/dL    Alkaline Phosphatase 70 40 - 150 U/L    ALT 25 0 - 50 U/L    AST 23 0 - 45 U/L   Lactic acid whole blood   Result Value Ref Range    Lactic Acid 0.9 0.7 - 2.0 mmol/L   Lipase   Result Value Ref Range    Lipase 101 73 - 393 U/L   XR Abdomen 2 Views    Narrative    ABDOMEN TWO VIEWS 9/20/2019 7:54 PM     HISTORY: Abdominal pain. Nausea/vomiting.    COMPARISON: None.      Impression    IMPRESSION: There are mildly dilated loops of small bowel in the  central abdomen that measure up to 3.0 cm. There is stool and gas in  the colon. No free air or abdominal mass effect. These findings may  represent early mechanical small bowel obstruction or mild ileus.    CASIMIRO FUNES MD   UA reflex to Microscopic   Result Value Ref Range    Color Urine Sylvia     Appearance Urine Slightly Cloudy     Glucose Urine Negative NEG^Negative mg/dL    Bilirubin Urine Small (A) NEG^Negative    Ketones Urine 80 (A) NEG^Negative mg/dL    Specific Gravity Urine 1.028 1.003 - 1.035    Blood Urine Small (A) NEG^Negative    pH Urine 6.0 5.0 - 7.0 pH    Protein Albumin Urine 30 (A) NEG^Negative mg/dL    Urobilinogen mg/dL 2.0 0.0 - 2.0 mg/dL    Nitrite Urine Negative NEG^Negative    Leukocyte Esterase Urine Negative NEG^Negative    Source Midstream Urine     RBC Urine 17 (H) 0 - 2 /HPF    WBC Urine 2 0 - 5 /HPF    Squamous Epithelial /HPF Urine <1 0 - 1 /HPF    Mucous Urine Present (A) NEG^Negative /LPF    Hyaline Casts 1 0 - 2 /LPF   CT ABDOMEN PELVIS W CONTRAST    Narrative    CT ABDOMEN AND PELVIS WITH CONTRAST   9/20/2019 9:25 PM     HISTORY: Abdominal pain. Nausea, vomiting. Ileus versus early small  bowel obstruction.      TECHNIQUE: 50 mL- Isovue 370. CT images of the abdomen and pelvis  following nonionic intravenous contrast. Radiation dose for this scan  was reduced using automated exposure control, adjustment of the mA  and/or kV according to patient size, or iterative  reconstruction  technique.    COMPARISON: None.    FINDINGS: Numerous dilated small bowel loops. The distal ileum appears  decompressed. The appendix is not identified. No free air or abscess  demonstrated. There is stool in nondilated colon.    The liver, gallbladder, pancreas, spleen, adrenal glands, and left  adrenal gland appear normal. There is an indeterminant right adrenal  nodule that measures 1.4 cm. No hydronephrosis or solid renal mass.    There is severe aortic atherosclerosis at the bifurcation with nearly  50% stenosis of the origin of the right common iliac artery.    No enlarged abdominal or retroperitoneal lymph nodes. No pelvic  adenopathy. Trace pelvic free fluid.    No lytic or blastic bone lesions.      Impression    IMPRESSION:  1. Dilated small bowel loops with decompressed terminal ileum  compatible with mechanical small bowel obstruction in the distal small  bowel. Obstructing hernia or mass not demonstrated.  2. No evidence of bowel perforation or abscess. There is trace  nonspecific free fluid in the pelvis.  3. Severe atherosclerosis of the aortic bifurcation.  4. Indeterminate right adrenal nodule measures 1.4 cm. Consider  nonemergent MRI for further evaluation.    CASIMIRO FUNES MD              Impression     Final diagnoses:   Small bowel obstruction (H)   Nausea and vomiting   Abdominal pain, epigastric         ED Course & Medical Decision Making   Heaven Contreras is a 49 year old female who presented to the emergency department with acute onset of epigastric abdominal pain, and intractable nausea and vomiting since this morning.  Patient reports pain in the epigastric region, and numerous episodes of vomiting.  Sure he continues to have nausea despite taking Zofran ODT at home.  She has had no previous abdominal surgeries.  The patient was seen shortly after arrival.  Vital signs were stable, with temp of 97.5, blood pressure 146/104.  Patient had not voided much and felt very  thirsty.  She received IV fluids, Zofran for nausea, and subsequently Dilaudid for pain.  Initial abdominal x-ray reveals dilated small bowel loop.  White blood count was elevated.  CT scan of the abdomen with contrast reveals dilated small bowel loops with decompressed terminal ileum compatible with mechanical small bowel obstruction in the distal small bowel.  Patient continues to feel nauseated, and a nasogastric tube was ordered.  She has anxiety and Ativan was given.      10:32 PM: I discussed the case with Dr. Rodriguez and she felt that she could be consulted tomorrow if the hospitalist would like her involvement.  Otherwise symptoms may resolve with her current measures.    11:10 PM: I discussed the case with Dr. Ordaz.  Patient is currently being treated for small bowel obstruction.  She has a nasogastric tube.  She is getting additional antinausea medication.  Patient will be admitted.  Continue n.p.o. status until her small bowel obstruction resolves.  Anticipated length of stay is 2 to 4 days.           ED to Inpatient Handoff:    Discussed with Dr. Ordaz at 11:10 PM  Patient accepted for Inpatient Stay  Pending studies include Serial exams, x-ray; NPO, antinausea medication  Code Status: Full Code              Disclaimer: This note consists of words and symbols derived from keyboarding and dictation using voice recognition software.  As a result, there may be errors that have gone undetected.  Please consider this when interpreting information found in this note.       Jose Cho MD  09/20/19 1146

## 2019-09-21 NOTE — PROGRESS NOTES
S:  End of shift note    B:  SBO    A:  ml output to LIS.  Flushed once with 20 ml thick returns, light yellow, clear.  BS +Up ambulating in the halls,  Ativan given once for anxiety, dilaudid x 2.  NPO with ice chips.  LS clear, VSS.  Pt states she has a small mucus BM, writer did not see it.      R: contin;ue to encourage activity, ambulation

## 2019-09-22 PROBLEM — E87.6 HYPOKALEMIA: Status: ACTIVE | Noted: 2019-09-22

## 2019-09-22 LAB
ANION GAP SERPL CALCULATED.3IONS-SCNC: 8 MMOL/L (ref 3–14)
BUN SERPL-MCNC: 20 MG/DL (ref 7–30)
CALCIUM SERPL-MCNC: 8.4 MG/DL (ref 8.5–10.1)
CHLORIDE SERPL-SCNC: 115 MMOL/L (ref 94–109)
CO2 SERPL-SCNC: 25 MMOL/L (ref 20–32)
CREAT SERPL-MCNC: 0.57 MG/DL (ref 0.52–1.04)
ERYTHROCYTE [DISTWIDTH] IN BLOOD BY AUTOMATED COUNT: 13.2 % (ref 10–15)
GFR SERPL CREATININE-BSD FRML MDRD: >90 ML/MIN/{1.73_M2}
GLUCOSE SERPL-MCNC: 111 MG/DL (ref 70–99)
HCT VFR BLD AUTO: 36 % (ref 35–47)
HGB BLD-MCNC: 12 G/DL (ref 11.7–15.7)
MCH RBC QN AUTO: 31.2 PG (ref 26.5–33)
MCHC RBC AUTO-ENTMCNC: 33.3 G/DL (ref 31.5–36.5)
MCV RBC AUTO: 94 FL (ref 78–100)
PLATELET # BLD AUTO: 226 10E9/L (ref 150–450)
POTASSIUM SERPL-SCNC: 3.2 MMOL/L (ref 3.4–5.3)
POTASSIUM SERPL-SCNC: 3.7 MMOL/L (ref 3.4–5.3)
RBC # BLD AUTO: 3.85 10E12/L (ref 3.8–5.2)
SODIUM SERPL-SCNC: 148 MMOL/L (ref 133–144)
WBC # BLD AUTO: 8.4 10E9/L (ref 4–11)

## 2019-09-22 PROCEDURE — 84132 ASSAY OF SERUM POTASSIUM: CPT | Performed by: FAMILY MEDICINE

## 2019-09-22 PROCEDURE — 99233 SBSQ HOSP IP/OBS HIGH 50: CPT | Performed by: FAMILY MEDICINE

## 2019-09-22 PROCEDURE — 36415 COLL VENOUS BLD VENIPUNCTURE: CPT | Performed by: FAMILY MEDICINE

## 2019-09-22 PROCEDURE — 99207 ZZC CDG-MDM COMPONENT: MEETS MODERATE - UP CODED: CPT | Performed by: FAMILY MEDICINE

## 2019-09-22 PROCEDURE — 25000132 ZZH RX MED GY IP 250 OP 250 PS 637: Performed by: FAMILY MEDICINE

## 2019-09-22 PROCEDURE — 25800030 ZZH RX IP 258 OP 636: Performed by: HOSPITALIST

## 2019-09-22 PROCEDURE — 25000128 H RX IP 250 OP 636: Performed by: FAMILY MEDICINE

## 2019-09-22 PROCEDURE — 25800030 ZZH RX IP 258 OP 636: Performed by: FAMILY MEDICINE

## 2019-09-22 PROCEDURE — 80048 BASIC METABOLIC PNL TOTAL CA: CPT | Performed by: FAMILY MEDICINE

## 2019-09-22 PROCEDURE — 25000128 H RX IP 250 OP 636: Performed by: HOSPITALIST

## 2019-09-22 PROCEDURE — 25000132 ZZH RX MED GY IP 250 OP 250 PS 637: Performed by: HOSPITALIST

## 2019-09-22 PROCEDURE — 85027 COMPLETE CBC AUTOMATED: CPT | Performed by: FAMILY MEDICINE

## 2019-09-22 PROCEDURE — 12000000 ZZH R&B MED SURG/OB

## 2019-09-22 RX ORDER — POTASSIUM CHLORIDE 7.45 MG/ML
10 INJECTION INTRAVENOUS
Status: DISCONTINUED | OUTPATIENT
Start: 2019-09-22 | End: 2019-09-23 | Stop reason: HOSPADM

## 2019-09-22 RX ORDER — ESCITALOPRAM OXALATE 10 MG/1
20 TABLET ORAL DAILY
Status: DISCONTINUED | OUTPATIENT
Start: 2019-09-22 | End: 2019-09-23 | Stop reason: HOSPADM

## 2019-09-22 RX ORDER — POTASSIUM CHLORIDE 29.8 MG/ML
20 INJECTION INTRAVENOUS
Status: DISCONTINUED | OUTPATIENT
Start: 2019-09-22 | End: 2019-09-23 | Stop reason: HOSPADM

## 2019-09-22 RX ORDER — POTASSIUM CHLORIDE 1.5 G/1.58G
20-40 POWDER, FOR SOLUTION ORAL
Status: DISCONTINUED | OUTPATIENT
Start: 2019-09-22 | End: 2019-09-23 | Stop reason: HOSPADM

## 2019-09-22 RX ORDER — NICOTINE 21 MG/24HR
1 PATCH, TRANSDERMAL 24 HOURS TRANSDERMAL DAILY
Status: DISCONTINUED | OUTPATIENT
Start: 2019-09-22 | End: 2019-09-23 | Stop reason: HOSPADM

## 2019-09-22 RX ORDER — POTASSIUM CHLORIDE 1500 MG/1
20-40 TABLET, EXTENDED RELEASE ORAL
Status: DISCONTINUED | OUTPATIENT
Start: 2019-09-22 | End: 2019-09-23 | Stop reason: HOSPADM

## 2019-09-22 RX ORDER — CLONAZEPAM 0.5 MG/1
0.5 TABLET ORAL 3 TIMES DAILY PRN
Status: DISCONTINUED | OUTPATIENT
Start: 2019-09-22 | End: 2019-09-23 | Stop reason: HOSPADM

## 2019-09-22 RX ORDER — HYDROCODONE BITARTRATE AND ACETAMINOPHEN 5; 325 MG/1; MG/1
1-2 TABLET ORAL EVERY 4 HOURS PRN
Status: DISCONTINUED | OUTPATIENT
Start: 2019-09-22 | End: 2019-09-23 | Stop reason: HOSPADM

## 2019-09-22 RX ORDER — DEXTROSE MONOHYDRATE, SODIUM CHLORIDE, AND POTASSIUM CHLORIDE 50; 1.49; 4.5 G/1000ML; G/1000ML; G/1000ML
INJECTION, SOLUTION INTRAVENOUS CONTINUOUS
Status: DISCONTINUED | OUTPATIENT
Start: 2019-09-22 | End: 2019-09-22

## 2019-09-22 RX ORDER — POTASSIUM CL/LIDO/0.9 % NACL 10MEQ/0.1L
10 INTRAVENOUS SOLUTION, PIGGYBACK (ML) INTRAVENOUS
Status: DISCONTINUED | OUTPATIENT
Start: 2019-09-22 | End: 2019-09-23 | Stop reason: HOSPADM

## 2019-09-22 RX ADMIN — NICOTINE 1 PATCH: 21 PATCH TRANSDERMAL at 08:41

## 2019-09-22 RX ADMIN — HYDROMORPHONE HYDROCHLORIDE 0.2 MG: 1 INJECTION, SOLUTION INTRAMUSCULAR; INTRAVENOUS; SUBCUTANEOUS at 05:37

## 2019-09-22 RX ADMIN — CLONAZEPAM 0.5 MG: 0.5 TABLET ORAL at 18:42

## 2019-09-22 RX ADMIN — ESCITALOPRAM OXALATE 20 MG: 10 TABLET ORAL at 18:41

## 2019-09-22 RX ADMIN — Medication 10 MEQ: at 12:15

## 2019-09-22 RX ADMIN — HYDROMORPHONE HYDROCHLORIDE 0.2 MG: 1 INJECTION, SOLUTION INTRAMUSCULAR; INTRAVENOUS; SUBCUTANEOUS at 11:40

## 2019-09-22 RX ADMIN — Medication 10 MEQ: at 13:30

## 2019-09-22 RX ADMIN — HYDROCODONE BITARTRATE AND ACETAMINOPHEN 2 TABLET: 5; 325 TABLET ORAL at 18:41

## 2019-09-22 RX ADMIN — LORAZEPAM 0.5 MG: 2 INJECTION INTRAMUSCULAR; INTRAVENOUS at 07:38

## 2019-09-22 RX ADMIN — DEXTROSE AND SODIUM CHLORIDE: 5; 900 INJECTION, SOLUTION INTRAVENOUS at 07:41

## 2019-09-22 RX ADMIN — Medication 10 MEQ: at 09:53

## 2019-09-22 RX ADMIN — LORAZEPAM 0.5 MG: 2 INJECTION INTRAMUSCULAR; INTRAVENOUS at 14:45

## 2019-09-22 RX ADMIN — Medication 1 MG: at 21:30

## 2019-09-22 RX ADMIN — POTASSIUM CHLORIDE, DEXTROSE MONOHYDRATE AND SODIUM CHLORIDE: 150; 5; 450 INJECTION, SOLUTION INTRAVENOUS at 08:41

## 2019-09-22 RX ADMIN — Medication 1 LOZENGE: at 14:59

## 2019-09-22 RX ADMIN — Medication 10 MEQ: at 10:56

## 2019-09-22 ASSESSMENT — ACTIVITIES OF DAILY LIVING (ADL)
ADLS_ACUITY_SCORE: 10

## 2019-09-22 NOTE — PROGRESS NOTES
"S: Patient has indicated intent to leave the unit to smoke.    B: SBO    A: Patient has indicated intent to leave the unit to smoke.    Has been notified that smoking is not allowed on the hospital premises.    Has been offered nicotine replacement therapy and smoking cessation resources.     Nicotine patch ordered and placed. Pt reported that she went out to smoke with family, took off her patch during smoking, and placed it back on when she returned to room.    Has been notified that, according to policy, staff will not accompany patient off the unit for the purpose of smoking.    Has been advised of the potential safety concerns related to leaving the unit unsupervised by staff    R: Patient verbalizes understanding of education and chooses to leave the unit to smoke in spite of being advised against it, and has been requested to return within 30 minutes. Writer reinforced education on patch and smoking. Pt responded, she \"will try not to have a smoke today\".  "

## 2019-09-22 NOTE — PROGRESS NOTES
[facility]    Medicine Progress Note - Hospitalist Service       Date of Admission:  9/20/2019  Assessment & Plan   Principal Problem:    SBO (small bowel obstruction) (H)    Assessment: Most likely secondary to adhesions as patient has had 3 surgical procedures on her abdomen in the past.  Patient has responded well with patient now passing flatus periodically over the night and having a soft semiformed bowel movement this morning.      Plan: Will clamp NG tube this morning and consider starting slow amounts of clear liquids later this morning if patient continues to improve.  Would advance diet slowly with possible discharge home tomorrow if everything goes well.      Active Problems:    Hyponatremia    Assessment:  Not present initially, slightly low this morning at 3.2, likely secondary to NPO status and fluid resuscitation.      Plan:  Will change IV fluids to D5 1/2NS with 20 mEq KCl and start low protocol potassium replacement and monitor for resolution.        Generalized anxiety disorder    Assessment: Normally well controlled with Lexapro and clonazepam, which are currently on hold secondary to patient's small bowel obstruction as above.  Patient does have PRN Ativan available for anxiety and this is been working well when needed.    Plan: We will continue with IV Ativan, restart Lexapro and clonazepam when patient is able to take oral medications, hopefully later today      RSD (reflex sympathetic dystrophy)    Assessment: Causing chronic pain for which patient normally takes hydrocodone/acetaminophen 10/325mg every every hours as needed for pain with 150 tablets prescribed per month.  Has been receiving Dilaudid 0.2 mg IV every 6 hours as needed for pain with chronic pain fairly well controlled.    Plan: Continue with PRN IV Dilaudid while patient is n.p.o., resume patient's home hydrocodone regimen when patient is able to take oral medications, hopefully later today       Chronic constipation    Assessment: secondary to chronic narcotic use and some concern for underlying IBS.      Plan: no acute management is needed at this time, continue with management of SBO as above      Adrenal adenoma, right    Assessment: has been being followed as an outpatient and per outside records has been stable in size.  Patient has an appointment with an endocrinologist next month for further evaluation    Plan: no acute intervention is needed, patient will proceed with outpatient follow up as previously recommended.      Tobacco abuse    Assessment:  Patient has been craving nicotine and has left the floor to smoke 3 times in the past 24 hours.  Nicotine patch was offered and patient would like it started.  She normally smokes 1ppd    Plan:  Will start 21 mg patch daily.  Patient is aware that if she chooses to go out a smoke going forward, the patch will need to be removed.           Diet: NPO for Medical/Clinical Reasons Except for: Meds, Ice Chips    DVT Prophylaxis: Pneumatic Compression Devices  Lilly Catheter: not present  Code Status: Full Code      Disposition Plan   Expected discharge: Tomorrow, recommended to prior living arrangement once patient's diet has been advanced without difficulty.  Entered: Jnaey Santiago MD 09/22/2019, 7:25 AM       The patient's care was discussed with the Bedside Nurse and Patient.    Janey Santiago MD  Hospitalist Service  [facility]    ______________________________________________________________________    Interval History   Patient has remained vitally stable overnight.  Starting having increasing amounts of flatus overnight and this morning had a semiformed bowel movement.  Continues to feel slightly more bloated than baseline but no other symptoms.  Has been leaving the floor occasionally to smoke.     Data reviewed today: I reviewed all medications, new labs and imaging results over the last 24  hours.    Physical Exam   Vital Signs: Temp: 97.9  F (36.6  C) Temp src: Oral BP: 96/62 Pulse: 61   Resp: 16 SpO2: 98 % O2 Device: None (Room air)    Weight: 108 lbs 8 oz  Constitutional: awake, alert, cooperative, no apparent distress, and appears stated age  Respiratory: No increased work of breathing, good air exchange, clear to auscultation bilaterally, no crackles or wheezing  Cardiovascular: Normal apical impulse, regular rate and rhythm, normal S1 and S2, no S3 or S4, and no murmur noted  GI: bowel sounds present in all 4 quadrants, abdomen soft and non-tender  Skin: no redness, warmth, or swelling  Musculoskeletal: no lower extremity pitting edema present  Neurologic: Awake, alert, oriented to name, place and time.      Data   Recent Labs   Lab 09/22/19  0612 09/21/19  0758 09/20/19  1938   WBC 8.4 12.5* 13.6*   HGB 12.0 13.1 15.4   MCV 94 91 89    249 294   * 143 140   POTASSIUM 3.2* 3.4 3.4   CHLORIDE 115* 112* 106   CO2 25 23 23   BUN 20 17 20   CR 0.57 0.62 0.56   ANIONGAP 8 8 11   JAIME 8.4* 8.1* 9.3   * 134* 133*   ALBUMIN  --  3.2* 4.3   PROTTOTAL  --  6.0* 7.7   BILITOTAL  --  0.6 0.7   ALKPHOS  --  53 70   ALT  --  19 25   AST  --  17 23   LIPASE  --   --  101

## 2019-09-22 NOTE — PROGRESS NOTES
"S-(situation): shift note    B-(background): SBO    A-(assessment):pt has been tolerating increments of clear liquids without complications, see I&O f/s. NG D/C'd @ 1700. Having loose/soft BM, voiding without incident. Up independently in halls.  /52 (BP Location: Left arm)   Pulse 60   Temp 99.1  F (37.3  C) (Oral)   Resp 16   Ht 1.626 m (5' 4\")   Wt 49.2 kg (108 lb 8 oz)   SpO2 98%   BMI 18.62 kg/m      R-(recommendations): will continue with POC.  "

## 2019-09-22 NOTE — PLAN OF CARE
"S-(situation): End of shift note    B-(background): SBO    A-(assessment): Vital signs stable.  /74 (BP Location: Right arm)   Pulse 69   Temp 98.3  F (36.8  C) (Oral)   Resp 16   Ht 1.626 m (5' 4\")   Wt 49.2 kg (108 lb 8 oz)   SpO2 97%   BMI 18.62 kg/m  . Afebrile. Lung sounds CTA B/L.  IS in use. On RA.  A&O.  Bowel sounds normoactive. Flatulence and BM x2. Ambulating independently, down halls and outside without notification. IV dilaudid x 2 for pain control.   Voiding adequately.  SCDs in use.   IV site asymptomatic. Pt denying Nausea at this time.  Able to make needs known and uses call light appropriately.     R-(recommendations): Continue to monitor per care plan.    "

## 2019-09-22 NOTE — PROGRESS NOTES
Pt was ambulating halls and left with IV pole to go outside without notifying nurse. Pt was just given a dose of IV dilaudid less than 5 min prior to leaving.

## 2019-09-23 VITALS
WEIGHT: 108.5 LBS | HEIGHT: 64 IN | RESPIRATION RATE: 16 BRPM | OXYGEN SATURATION: 98 % | HEART RATE: 61 BPM | BODY MASS INDEX: 18.52 KG/M2 | TEMPERATURE: 97.7 F | DIASTOLIC BLOOD PRESSURE: 72 MMHG | SYSTOLIC BLOOD PRESSURE: 120 MMHG

## 2019-09-23 LAB
ANION GAP SERPL CALCULATED.3IONS-SCNC: 6 MMOL/L (ref 3–14)
BUN SERPL-MCNC: 15 MG/DL (ref 7–30)
CALCIUM SERPL-MCNC: 8.2 MG/DL (ref 8.5–10.1)
CHLORIDE SERPL-SCNC: 114 MMOL/L (ref 94–109)
CO2 SERPL-SCNC: 23 MMOL/L (ref 20–32)
CREAT SERPL-MCNC: 0.51 MG/DL (ref 0.52–1.04)
GFR SERPL CREATININE-BSD FRML MDRD: >90 ML/MIN/{1.73_M2}
GLUCOSE SERPL-MCNC: 101 MG/DL (ref 70–99)
POTASSIUM SERPL-SCNC: 3.8 MMOL/L (ref 3.4–5.3)
SODIUM SERPL-SCNC: 143 MMOL/L (ref 133–144)
WBC # BLD AUTO: 8.4 10E9/L (ref 4–11)

## 2019-09-23 PROCEDURE — 80048 BASIC METABOLIC PNL TOTAL CA: CPT | Performed by: FAMILY MEDICINE

## 2019-09-23 PROCEDURE — 99239 HOSP IP/OBS DSCHRG MGMT >30: CPT | Performed by: FAMILY MEDICINE

## 2019-09-23 PROCEDURE — 25000132 ZZH RX MED GY IP 250 OP 250 PS 637: Performed by: FAMILY MEDICINE

## 2019-09-23 PROCEDURE — 36415 COLL VENOUS BLD VENIPUNCTURE: CPT | Performed by: FAMILY MEDICINE

## 2019-09-23 PROCEDURE — 85048 AUTOMATED LEUKOCYTE COUNT: CPT | Performed by: FAMILY MEDICINE

## 2019-09-23 RX ADMIN — HYDROCODONE BITARTRATE AND ACETAMINOPHEN 2 TABLET: 5; 325 TABLET ORAL at 04:02

## 2019-09-23 ASSESSMENT — ACTIVITIES OF DAILY LIVING (ADL)
ADLS_ACUITY_SCORE: 10

## 2019-09-23 NOTE — PROGRESS NOTES
"S-(situation): Patient discharged to home via ambulation with  @ 0900.    B-(background): SBO    A-(assessment): has tolerated regular diet for breakfast without complications. Reports having loose/watery BM. Up independently in halls. No new concerns. /72   Pulse 61   Temp 97.7  F (36.5  C) (Oral)   Resp 16   Ht 1.626 m (5' 4\")   Wt 49.2 kg (108 lb 8 oz)   SpO2 98%   BMI 18.62 kg/m      R-(recommendations): Listed belongings gathered and sent with patient.     Discharge Nursing Criteria:  discharge instructions given to pt, verbalized understanding.    Care Plan and Patient education resolved: Yes      Vaccines  Influenza status verified at discharge:  Yes    MISC  Home and hospital aquired medications returned to patient: NA  Medication Bin checked and emptied on discharge Yes          "

## 2019-09-23 NOTE — DISCHARGE INSTRUCTIONS
Hospital follow up appointment:  Monday September 30th at 5:00pm  Dr. Valdez  [facility]  [phone]

## 2019-09-23 NOTE — PLAN OF CARE
"S-(situation): End of shift note    B-(background): SBO    A-(assessment): Vital signs stable. /72 (BP Location: Right arm)   Pulse 57   Temp 97.9  F (36.6  C) (Oral)   Resp 16   Ht 1.626 m (5' 4\")   Wt 49.2 kg (108 lb 8 oz)   SpO2 99%   BMI 18.62 kg/m  .  Afebrile. Lung sounds CTA B/L.  A&O. Pt denied abdominal pain, but had wrist and leg pain rated at 8/10. 2 tabs norco given x 1per MAR.  no pain medication needed.  Bowel sounds hyperactive.  Ambulating independently in halls and outside.  Voiding adequately, loose BM x3.  IV site asymptomatic and saline locked. Tolerating full liquids with no nausea.  Able to make needs known and uses call light appropriately.     R-(recommendations): Continue to monitor per care plan.    "

## 2019-09-23 NOTE — DISCHARGE SUMMARY
[facility]  Hospitalist Discharge Summary       Date of Admission:  9/20/2019  Date of Discharge:  9/23/2019  Discharging Provider: Janey Santiago MD  Discharge Diagnoses   Principal Problem:    SBO (small bowel obstruction) (H)  Active Problems:    Generalized anxiety disorder    RSD (reflex sympathetic dystrophy)    Tobacco abuse    Chronic constipation    Adrenal adenoma, right    Hypokalemia    Follow-ups Needed After Discharge   Follow-up Appointments     Follow-up and recommended labs and tests       Follow up with primary care provider, ESAU JALLOH, within 7 days for   hospital follow- up.             Discharge Disposition   Discharged to home  Condition at discharge: Stable    Hospital Course     Patient is a 49-year-old female who presented with worsening abdominal pain, nausea, vomiting, and lack of bowel movement.  She was found to have a small bowel obstruction, felt most likely secondary to adhesions from previous surgical procedures and was admitted for ongoing medical management.  NG tube was placed and patient had progressive resolution of her symptoms.  Her diet was advanced without difficulty.  She is being discharged home in stable condition    Principal Problem:    SBO (small bowel obstruction) (H)    Assessment: Most likely secondary to adhesions as patient has had 3 surgical procedures on her abdomen in the past.  Patient has responded well with medical management and is tolerating advancement of her diet without difficulty    Plan: Patient will discharge home with ongoing advancement of her diet as tolerated.  She will follow-up with her primary care provider to ensure ongoing improvement following discharge.    Active Problems:    Hyponatremia    Assessment:  Not present initially, slightly low this morning at 3.2 following initial fluid resuscitation and resolved following protocol replacement and initiation of regular diet    Plan: No further  monitoring or medication as needed at time of discharge      Generalized anxiety disorder    Assessment: Normally well controlled with Lexapro and clonazepam    Plan: Discharge home without change to home regimen      RSD (reflex sympathetic dystrophy)    Assessment: Causing chronic pain for which patient normally takes hydrocodone/acetaminophen 10/325mg every every hours as needed for pain with 150 tablets prescribed per month.  Patient's pain was controlled with Dilaudid 0.2 mg every 6 hours as needed for pain while n.p.o., however she was transition back to her home regimen without difficulty    Plan: Discharge home without change to home regimen      Chronic constipation    Assessment: secondary to chronic narcotic use and some concern for underlying IBS.      Plan: no acute management is needed at this time however patient was encouraged to discuss options to improve her bowel management with her primary care provider to avoid constipation going forward      Adrenal adenoma, right    Assessment: has been being followed as an outpatient and per outside records has been stable in size.  Patient has an appointment with an endocrinologist next month for further evaluation    Plan: Patient will proceed with outpatient follow up as previously recommended.      Tobacco abuse    Assessment: Patient was offered a nicotine patch and had been placed during this hospitalization, however she continued to go outside to smoke.  The nicotine patch was discontinued.  Patient has no interest in quitting smoking at this time    Plan: Encourage consideration of smoking cessation going forward       Consultations This Hospital Stay   None    Code Status   Full Code    Time Spent on this Encounter   I, Janey Santiago MD, personally saw the patient today and spent greater than 30 minutes discharging this patient.       Janey Santiago MD  [facility]  ______________________________________________________________________    Physical Exam   Vital Signs: Temp: 97.9  F (36.6  C) Temp src: Oral BP: 108/72 Pulse: 57   Resp: 16 SpO2: 99 % O2 Device: None (Room air)    Weight: 108 lbs 8 oz  Constitutional: awake, alert, cooperative, no apparent distress, and appears stated age  Respiratory: No increased work of breathing, good air exchange, clear to auscultation bilaterally, no crackles or wheezing  Cardiovascular: Normal apical impulse, regular rate and rhythm, normal S1 and S2, no S3 or S4, and no murmur noted  GI: bowel sounds present in all four quadrants, abdomen soft and non-tender  Skin: no redness, warmth, or swelling  Musculoskeletal: no lower extremity pitting edema present  Neurologic: Awake, alert, oriented to name, place and time.        Primary Care Physician   ESAU JALLOH    Discharge Orders      Reason for your hospital stay    Small bowel obstruction, most likely caused by scar tissue from your previous surgeries and you have responded very well to the medical management given during your hospital stay.  You can continue to eat a regular diet as tolerated.  As discussed, it is recommended that you have a colonoscopy when you turn 50.  Enjoy going home!     Follow-up and recommended labs and tests     Follow up with primary care provider, ESAU JALLOH, within 7 days for hospital follow- up.     Activity    Your activity upon discharge: activity as tolerated     Diet    Follow this diet upon discharge: Regular     Discharge Medications   Current Discharge Medication List      CONTINUE these medications which have NOT CHANGED    Details   clonazePAM (KLONOPIN) 0.5 MG tablet Take 0.5 mg by mouth 3 times daily as needed for anxiety       escitalopram (LEXAPRO) 20 MG tablet Take 20 mg by mouth daily       HYDROcodone-acetaminophen (NORCO) 5-325 MG tablet Take 1-2 tablets by mouth every 4 hours as needed for moderate to severe pain      "  ondansetron (ZOFRAN-ODT) 4 MG ODT tab Place 4 mg under the tongue every 6 hours as needed for nausea or vomiting       senna-docusate (SENOKOT-S/PERICOLACE) 8.6-50 MG tablet Take 1 tablet by mouth daily as needed for constipation       ORDER FOR DME Equipment being ordered: a cane  Qty: 1 Units, Refills: 0    Associated Diagnoses: RSD (reflex sympathetic dystrophy)           Allergies   Allergies   Allergen Reactions     Compazine [Prochlorperazine]      Feeling severe anxiety     Flexeril [Cyclobenzaprine] Other (See Comments)     Aggitation     Gabapentin      Swelling of legs     Lyrica [Pregabalin]      Prednisone Other (See Comments)     \"manic, cramping\"     Toradol Swelling     Rapid weight gain - fluid overlaod     "

## 2019-09-23 NOTE — PROGRESS NOTES
S: Patient has indicated intent to leave the unit to smoke.    B: SBO    A: Patient has indicated intent to leave the unit to smoke.    Has been notified that smoking is not allowed on the hospital premises.    Has been offered nicotine replacement therapy and smoking cessation resources, but has declined.    Has been notified that, according to policy, staff will not accompany patient off the unit for the purpose of smoking.    Has been advised of the potential safety concerns related to leaving the unit unsupervised by staff    R: Patient verbalizes understanding of education and chooses to leave the unit to smoke in spite of being advised against it, and has been requested to return within 30 minutes.

## 2019-09-27 NOTE — PROGRESS NOTES
"Heaven Contreras  Gender: female  : 1970  [address]  [facility] [id number]  [phone] (home)     Medical Record: [id number]  Pharmacy: [facility] [place], [place] - [address]  Primary Care Provider: Mary Valdez    Parent's names are: Nga Young (mother) and Data Unavailable (father).      [facility]  2019     Discharge Phone Call:  Key Words/Key Times      Introduction - AIDET (Acknowledge, Introduce, Duration, Explanation)      Empathy-   We are calling to see how you are since your recent stay in the hospital?     Call back COMMENTS: \"I'm doing pretty good. Still bloating. Things are moving. Tired. Low grade fever off/on.\"      Clinical Questions -  (f/u appts, medication side effects/purpose, ability to care for self at home) \"For your safety, it is important to us that you understand the purpose and side effects of your medications, can you tell me what your new medications are?\"     Call back COMMENTS: nothing new.       Staff Recognition -  We like to recognize staff and physicians who have done an excellent job.  Do you remember any people from your care team that you would like recognize?     Call back COMMENTS: Nurse Reta, so wonderful and incredible overheard between her and other patients. Observed over 2 days and she went above and beyond. Dr. Santiago explained things very well. Very impressed. Fabulous care.      Very Good Care -  We want to provide very good care to all patients.  How was your care?     Call back COMMENTS: It was great. Thinking of transferring to [place].     Opportunities for Improvement -  Our goal is to be the best.  Do you have any suggestions for things that we could improve upon?     Call back COMMENTS: Couple times where IV pump beeping and waiting.       Thank You     "

## 2019-09-30 ENCOUNTER — HEALTH MAINTENANCE LETTER (OUTPATIENT)
Age: 49
End: 2019-09-30

## 2019-10-23 DIAGNOSIS — E27.8 ADRENAL MASS (H): Primary | ICD-10-CM

## 2019-10-23 PROCEDURE — 99000 SPECIMEN HANDLING OFFICE-LAB: CPT | Performed by: INTERNAL MEDICINE

## 2019-10-23 PROCEDURE — 82384 ASSAY THREE CATECHOLAMINES: CPT | Mod: 90 | Performed by: INTERNAL MEDICINE

## 2019-10-27 LAB
CATECHOLS UR-IMP: NORMAL
COLLECT DURATION TIME SPEC: 24 H
CREAT 24H UR-MRATE: 928 MG/D (ref 700–1600)
CREAT UR-MCNC: 58 MG/DL
DOPAMINE 24H UR-MRATE: 184 UG/D (ref 77–324)
DOPAMINE UR-MCNC: 6 UG/L
DOPAMINE/CREAT UR: 198 UG/G CRT (ref 0–250)
EPINEPH 24H UR-MRATE: 10 UG/D (ref 1–14)
EPINEPH UR-MCNC: 115 UG/L
EPINEPH/CREAT UR: 10 UG/G CRT (ref 0–20)
NOREPINEPH 24H UR-MRATE: 37 UG/D (ref 16–71)
NOREPINEPH UR-MCNC: 23 UG/L
NOREPINEPH/CREAT UR: 40 UG/G CRT (ref 0–45)
SPECIMEN VOL ?TM UR: 1600 ML

## 2019-12-15 ENCOUNTER — HEALTH MAINTENANCE LETTER (OUTPATIENT)
Age: 49
End: 2019-12-15

## 2020-03-22 ENCOUNTER — HEALTH MAINTENANCE LETTER (OUTPATIENT)
Age: 50
End: 2020-03-22

## 2021-01-15 ENCOUNTER — HEALTH MAINTENANCE LETTER (OUTPATIENT)
Age: 51
End: 2021-01-15

## 2021-05-09 ENCOUNTER — HEALTH MAINTENANCE LETTER (OUTPATIENT)
Age: 51
End: 2021-05-09

## 2021-10-24 ENCOUNTER — HEALTH MAINTENANCE LETTER (OUTPATIENT)
Age: 51
End: 2021-10-24

## 2022-02-13 ENCOUNTER — HEALTH MAINTENANCE LETTER (OUTPATIENT)
Age: 52
End: 2022-02-13

## 2022-06-05 ENCOUNTER — HEALTH MAINTENANCE LETTER (OUTPATIENT)
Age: 52
End: 2022-06-05

## 2022-10-16 ENCOUNTER — HEALTH MAINTENANCE LETTER (OUTPATIENT)
Age: 52
End: 2022-10-16

## 2023-03-26 ENCOUNTER — HEALTH MAINTENANCE LETTER (OUTPATIENT)
Age: 53
End: 2023-03-26

## 2023-06-17 ENCOUNTER — HEALTH MAINTENANCE LETTER (OUTPATIENT)
Age: 53
End: 2023-06-17